# Patient Record
Sex: MALE | Race: BLACK OR AFRICAN AMERICAN | NOT HISPANIC OR LATINO | Employment: FULL TIME | URBAN - METROPOLITAN AREA
[De-identification: names, ages, dates, MRNs, and addresses within clinical notes are randomized per-mention and may not be internally consistent; named-entity substitution may affect disease eponyms.]

---

## 2023-03-18 ENCOUNTER — HOSPITAL ENCOUNTER (EMERGENCY)
Facility: OTHER | Age: 23
Discharge: LEFT BEFORE DISCHARGE | End: 2023-03-18
Payer: MEDICAID

## 2023-03-18 VITALS
HEIGHT: 69 IN | OXYGEN SATURATION: 100 % | BODY MASS INDEX: 23.7 KG/M2 | RESPIRATION RATE: 18 BRPM | HEART RATE: 59 BPM | SYSTOLIC BLOOD PRESSURE: 142 MMHG | WEIGHT: 160 LBS | DIASTOLIC BLOOD PRESSURE: 69 MMHG | TEMPERATURE: 98 F

## 2023-03-18 PROCEDURE — 99283 EMERGENCY DEPT VISIT LOW MDM: CPT

## 2023-03-18 NOTE — FIRST PROVIDER EVALUATION
Emergency Department TeleTriage Encounter Note      CHIEF COMPLAINT    Chief Complaint   Patient presents with    thumb/hand pain     Co L sided hand/thumb pain onset yesterday, denies injury/trauma. States pain starts at the wrist and radiates up to thumb, reports numbness/tingling to thumb. 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        VITAL SIGNS   Initial Vitals [03/18/23 1629]   BP Pulse Resp Temp SpO2   (!) 142/69 (!) 59 18 97.8 °F (36.6 °C) 100 %      MAP       --            ALLERGIES    Review of patient's allergies indicates:  No Known Allergies    PROVIDER TRIAGE NOTE  This is a teletriage evaluation of a 22 y.o. male presenting to the ED complaining of left hand pain starting yesterday. Pain at thenar aspect of left hand. No trauma. Reports he works with his hands at work.     Well-appearing, no distress.     Initial orders will be placed and care will be transferred to an alternate provider when patient is roomed for a full evaluation. Any additional orders and the final disposition will be determined by that provider.         ORDERS  Labs Reviewed - No data to display    ED Orders (720h ago, onward)      Start Ordered     Status Ordering Provider    03/18/23 1638 03/18/23 1638  X-Ray Hand 3 View Left  1 time imaging         Ordered BRISA SPIVEY              Virtual Visit Note: The provider triage portion of this emergency department evaluation and documentation was performed via [employer], a HIPAA-compliant telemedicine application, in concert with a tele-presenter in the room. A face to face patient evaluation with one of my colleagues will occur once the patient is placed in an emergency department room.      DISCLAIMER: This note was prepared with [employer] voice recognition transcription software. Garbled syntax, mangled pronouns, and other bizarre constructions may be attributed to that software system.

## 2024-01-14 ENCOUNTER — HOSPITAL ENCOUNTER (EMERGENCY)
Facility: OTHER | Age: 24
Discharge: HOME | End: 2024-01-15
Attending: EMERGENCY MEDICINE
Payer: MEDICAID

## 2024-01-14 DIAGNOSIS — S92.411A CLOSED DISPLACED FRACTURE OF PROXIMAL PHALANX OF RIGHT GREAT TOE, INITIAL ENCOUNTER: Primary | ICD-10-CM

## 2024-01-14 PROCEDURE — 99283 EMERGENCY DEPT VISIT LOW MDM: CPT

## 2024-01-15 VITALS
OXYGEN SATURATION: 98 % | SYSTOLIC BLOOD PRESSURE: 130 MMHG | DIASTOLIC BLOOD PRESSURE: 70 MMHG | HEART RATE: 71 BPM | TEMPERATURE: 100 F | RESPIRATION RATE: 18 BRPM

## 2024-01-15 PROCEDURE — 25000003 PHARM REV CODE 250: Performed by: EMERGENCY MEDICINE

## 2024-01-15 RX ORDER — IBUPROFEN 400 MG/1
800 TABLET ORAL
Status: COMPLETED | OUTPATIENT
Start: 2024-01-15 | End: 2024-01-15

## 2024-01-15 RX ORDER — IBUPROFEN 800 MG/1
800 TABLET ORAL EVERY 6 HOURS PRN
Qty: 20 TABLET | Refills: 0 | Status: SHIPPED | OUTPATIENT
Start: 2024-01-15

## 2024-01-15 RX ADMIN — IBUPROFEN 800 MG: 400 TABLET ORAL at 12:01
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
## 2024-01-22 ENCOUNTER — HOSPITAL ENCOUNTER (EMERGENCY)
Facility: OTHER | Age: 24
Discharge: LEFT BEFORE DISCHARGE | End: 2024-01-22
Payer: MEDICAID

## 2024-01-22 VITALS
WEIGHT: 160 LBS | TEMPERATURE: 99 F | OXYGEN SATURATION: 99 % | RESPIRATION RATE: 17 BRPM | SYSTOLIC BLOOD PRESSURE: 128 MMHG | DIASTOLIC BLOOD PRESSURE: 80 MMHG | HEART RATE: 89 BPM | BODY MASS INDEX: 23.7 KG/M2 | HEIGHT: 69 IN

## 2024-01-22 LAB
CTP QC/QA: YES
POC MOLECULAR INFLUENZA A AGN: NEGATIVE
POC MOLECULAR INFLUENZA B AGN: POSITIVE

## 2024-01-22 PROCEDURE — 99282 EMERGENCY DEPT VISIT SF MDM: CPT

## 2024-01-22 RX ORDER — ONDANSETRON 4 MG/1
4 TABLET, ORALLY DISINTEGRATING ORAL
Status: DISCONTINUED | OUTPATIENT
Start: 2024-01-22 | End: 2024-01-22 | Stop reason: HOSPADM

## 2024-01-23 NOTE — FIRST PROVIDER EVALUATION
Emergency Department TeleTriage Encounter Note      CHIEF COMPLAINT    Chief Complaint   Patient presents with    Cough     Pt c/o cough, sore throat, and runny nose x3 days. Reports roommate had Flu. Attempted Tylenol and DayQuil with no relief.        VITAL SIGNS   Initial Vitals [01/22/24 1940]   BP Pulse Resp Temp SpO2   128/80 89 17 98.5 °F (36.9 °C) 99 %      MAP       --            ALLERGIES    Review of patient's allergies indicates:  No Known Allergies    PROVIDER TRIAGE NOTE  Patient presents with cough, sore throat, rhinorrhea and nausea.       ORDERS  Labs Reviewed - No data to display    ED Orders (720h ago, onward)      None              Virtual Visit Note: The provider triage portion of this emergency department evaluation and documentation was performed via [employer], a HIPAA-compliant telemedicine application, in concert with a tele-presenter in the room. A face to face patient evaluation with one of my colleagues will occur once the patient is placed in an emergency department room.      DISCLAIMER: This note was prepared with [employer] voice recognition transcription software. Garbled syntax, mangled pronouns, and other bizarre constructions may be attributed to that software system.

## 2024-01-23 NOTE — ED NOTES
"In attempt to room pt, unable to locate.  reports that pt eloped and stated "I don't want to wait".  "

## 2024-01-23 NOTE — ED NOTES
""If they are just going to give me Tylenol I might as well go home, I got my own Tylenol there".  "

## 2024-01-23 NOTE — ED TRIAGE NOTES
Productive cough with fever and sore throat x 3 days. Taking OTC cold meds with minimal relief. Reports yellow sputum. Presents awake, alert. No distress.
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
## 2025-03-26 ENCOUNTER — HOSPITAL ENCOUNTER (EMERGENCY)
Facility: OTHER | Age: 25
Discharge: HOME | End: 2025-03-26
Attending: EMERGENCY MEDICINE
Payer: MEDICAID

## 2025-03-26 VITALS
HEART RATE: 100 BPM | BODY MASS INDEX: 25.73 KG/M2 | DIASTOLIC BLOOD PRESSURE: 61 MMHG | OXYGEN SATURATION: 95 % | HEIGHT: 72 IN | TEMPERATURE: 98 F | WEIGHT: 190 LBS | SYSTOLIC BLOOD PRESSURE: 138 MMHG | RESPIRATION RATE: 15 BRPM

## 2025-03-26 DIAGNOSIS — R60.0 PEDAL EDEMA: Primary | ICD-10-CM

## 2025-03-26 PROCEDURE — 99281 EMR DPT VST MAYX REQ PHY/QHP: CPT

## 2025-03-26 NOTE — ED TRIAGE NOTES
Pt presents to ED with complaint of bilateral foot pain. States that pain started after pt worse shoes that were too big and irritated feet. Aaox4. NAD. VSS

## 2025-03-26 NOTE — Clinical Note
"Migue"Natalia Presley was seen and treated in our emergency department on 3/26/2025.  He may return to work on 03/27/2025.       If you have any questions or concerns, please don't hesitate to call.      Montse Bustillo MD"

## 2025-03-26 NOTE — ED PROVIDER NOTES
Encounter Date: 3/26/2025       History     Chief Complaint   Patient presents with    Foot Pain     Pain to the tops of both feet from wearing shoes that were too big, pt doesn't think he can work tomorrow and is requesting a work note     24-year-old male with no known past medical history presents complaining of bilateral foot swelling x2 days.  He reports wearing large boots for work over the past couple of days and thinks that this may have been the cause of the swelling.  He does report being on his feet a lot throughout the day and does not wear any compression or tightly fitting socks.  He denies any chest pain, shortness of breath, swelling in any other areas, fever, or chills.  Denies any trauma to the feet.  He states the swelling does improve when he elevates his feet at night.  He states he is here because he needs a note for work because he does not feel like he can work tomorrow.      Review of patient's allergies indicates:  No Known Allergies  History reviewed. No pertinent past medical history.  History reviewed. No pertinent surgical history.  No family history on file.  Social History[1]  Review of Systems    Physical Exam     Initial Vitals [03/26/25 0025]   BP Pulse Resp Temp SpO2   138/61 100 15 98.2 °F (36.8 °C) 95 %      MAP       --         Physical Exam    Constitutional: He appears well-developed and well-nourished. He is not diaphoretic. No distress.   HENT:   Head: Normocephalic and atraumatic.   Right Ear: External ear normal.   Left Ear: External ear normal.   Nose: Nose normal.   Cardiovascular:  Normal rate, regular rhythm and normal heart sounds.     Exam reveals no gallop and no friction rub.       No murmur heard.  Pulmonary/Chest: Breath sounds normal. No respiratory distress. He has no wheezes. He has no rhonchi. He has no rales.   Musculoskeletal:         General: Normal range of motion.      Comments: Trace pedal edema with no overlying erythema, warmth, or tenderness to  palpation.  No edema to ankles or lower legs.  2+ DP and PT pulses.           ED Course   Procedures  Labs Reviewed - No data to display       Imaging Results    None          Medications - No data to display  Medical Decision Making  24-year-old male presents afebrile, hemodynamically stable and well-appearing with complaint of pedal edema.  On exam he has trace edema to the dorsum of the feet which is symmetric.  There are no overlying skin changes concerning for cellulitis.  I have low suspicion for a DVT given the description of his symptoms.  I do not feel any further emergent evaluation is warranted at this time.  He has been instructed to elevate his feet whenever not walking and to wear compression stockings while at work.  PCP follow-up for re-evaluation as needed.                                      Clinical Impression:  Final diagnoses:  [R60.0] Pedal edema (Primary)          ED Disposition Condition    Discharge Stable          ED Prescriptions    None       Follow-up Information       Follow up With Specialties Details Why Contact Info    [facility] Schedule an appointment as soon as possible for a visit   [address]  [address] [id number] [phone]               [1]   Social History  Tobacco Use    Smoking status: Some Days     Current packs/day: 0.50     Average packs/day: 0.5 packs/day for 7.2 years (3.6 ttl pk-yrs)     Types: Cigarettes     Start date: 2018    Smokeless tobacco: Never   Substance Use Topics    Alcohol use: Not Currently    Drug use: Never        Montse Bustillo MD  03/26/25 0055

## 2025-04-02 ENCOUNTER — HOSPITAL ENCOUNTER (EMERGENCY)
Facility: OTHER | Age: 25
Discharge: HOME | End: 2025-04-02
Attending: EMERGENCY MEDICINE
Payer: MEDICAID

## 2025-04-02 VITALS
WEIGHT: 185 LBS | HEIGHT: 72 IN | SYSTOLIC BLOOD PRESSURE: 134 MMHG | RESPIRATION RATE: 18 BRPM | DIASTOLIC BLOOD PRESSURE: 69 MMHG | BODY MASS INDEX: 25.06 KG/M2 | OXYGEN SATURATION: 99 % | HEART RATE: 82 BPM | TEMPERATURE: 98 F

## 2025-04-02 DIAGNOSIS — R60.0 PEDAL EDEMA: Primary | ICD-10-CM

## 2025-04-02 LAB
ABSOLUTE EOSINOPHIL (OHS): 0.44 K/UL
ABSOLUTE MONOCYTE (OHS): 1.25 K/UL (ref 0.3–1)
ABSOLUTE NEUTROPHIL COUNT (OHS): 7.24 K/UL (ref 1.8–7.7)
ALBUMIN SERPL BCP-MCNC: 3.9 G/DL (ref 3.5–5.2)
ALP SERPL-CCNC: 59 UNIT/L (ref 40–150)
ALT SERPL W/O P-5'-P-CCNC: 22 UNIT/L (ref 10–44)
ANION GAP (OHS): 11 MMOL/L (ref 8–16)
AST SERPL-CCNC: 28 UNIT/L (ref 11–45)
BASOPHILS # BLD AUTO: 0.05 K/UL
BASOPHILS NFR BLD AUTO: 0.4 %
BILIRUB SERPL-MCNC: 0.5 MG/DL (ref 0.1–1)
BNP SERPL-MCNC: <10 PG/ML (ref 0–99)
BUN SERPL-MCNC: 15 MG/DL (ref 6–20)
CALCIUM SERPL-MCNC: 9.3 MG/DL (ref 8.7–10.5)
CHLORIDE SERPL-SCNC: 104 MMOL/L (ref 95–110)
CO2 SERPL-SCNC: 24 MMOL/L (ref 23–29)
CREAT SERPL-MCNC: 0.9 MG/DL (ref 0.5–1.4)
ERYTHROCYTE [DISTWIDTH] IN BLOOD BY AUTOMATED COUNT: 12.8 % (ref 11.5–14.5)
GFR SERPLBLD CREATININE-BSD FMLA CKD-EPI: >60 ML/MIN/1.73/M2
GLUCOSE SERPL-MCNC: 119 MG/DL (ref 70–110)
HCT VFR BLD AUTO: 38.3 % (ref 40–54)
HGB BLD-MCNC: 13.6 GM/DL (ref 14–18)
HOLD SPECIMEN: NORMAL
IMM GRANULOCYTES # BLD AUTO: 0.03 K/UL (ref 0–0.04)
IMM GRANULOCYTES NFR BLD AUTO: 0.2 % (ref 0–0.5)
LYMPHOCYTES # BLD AUTO: 3.58 K/UL (ref 1–4.8)
MCH RBC QN AUTO: 32 PG (ref 27–31)
MCHC RBC AUTO-ENTMCNC: 35.5 G/DL (ref 32–36)
MCV RBC AUTO: 90 FL (ref 82–98)
NUCLEATED RBC (/100WBC) (OHS): 0 /100 WBC
PLATELET # BLD AUTO: 314 K/UL (ref 150–450)
PMV BLD AUTO: 9.6 FL (ref 9.2–12.9)
POTASSIUM SERPL-SCNC: 3.7 MMOL/L (ref 3.5–5.1)
PROT SERPL-MCNC: 7.5 GM/DL (ref 6–8.4)
RBC # BLD AUTO: 4.25 M/UL (ref 4.6–6.2)
RELATIVE EOSINOPHIL (OHS): 3.5 %
RELATIVE LYMPHOCYTE (OHS): 28.4 % (ref 18–48)
RELATIVE MONOCYTE (OHS): 9.9 % (ref 4–15)
RELATIVE NEUTROPHIL (OHS): 57.6 % (ref 38–73)
SODIUM SERPL-SCNC: 139 MMOL/L (ref 136–145)
WBC # BLD AUTO: 12.59 K/UL (ref 3.9–12.7)

## 2025-04-02 PROCEDURE — 36415 COLL VENOUS BLD VENIPUNCTURE: CPT | Performed by: EMERGENCY MEDICINE

## 2025-04-02 PROCEDURE — 85025 COMPLETE CBC W/AUTO DIFF WBC: CPT | Performed by: EMERGENCY MEDICINE

## 2025-04-02 PROCEDURE — 80053 COMPREHEN METABOLIC PANEL: CPT | Performed by: EMERGENCY MEDICINE

## 2025-04-02 PROCEDURE — 83880 ASSAY OF NATRIURETIC PEPTIDE: CPT | Performed by: EMERGENCY MEDICINE

## 2025-04-02 PROCEDURE — 99283 EMERGENCY DEPT VISIT LOW MDM: CPT

## 2025-04-02 NOTE — ED TRIAGE NOTES
Migue Presley, a 24 y.o. male presents to the ED w/ complaint of bilateral feet swelling that started two weeks ago. Pt denies any new activities and states he has been working with the same company for over three years on and off. Pt also denies any trauma to his feet. Pt also endorses 2 weeks ago after noticing the swelling he started wearing compress shock. Pt is AAOX4 with no known HX and no acute distress.      Triage note:  Chief Complaint   Patient presents with    Leg Swelling     Pt states that he started to have non traumatic bilateral feet swelling that started 2 weeks ago. No hx     Review of patient's allergies indicates:  No Known Allergies  History reviewed. No pertinent past medical history.

## 2025-04-02 NOTE — ED PROVIDER NOTES
Encounter Date: 4/2/2025       History     Chief Complaint   Patient presents with    Leg Swelling     Pt states that he started to have non traumatic bilateral feet swelling that started 2 weeks ago. No hx     24-year-old male with no significant past medical history presents with complaint of recurrent foot swelling.  The patient was seen in this emergency department last month for the same complaint.  He denies any new or worsening symptoms.  Specifically denies any shortness of breath, chest pain, facial swelling, or upper extremity swelling.  He continues to report improvement with elevation and when he wears tight fitting socks.      Review of patient's allergies indicates:  No Known Allergies  History reviewed. No pertinent past medical history.  History reviewed. No pertinent surgical history.  No family history on file.  Social History[1]  Review of Systems    Physical Exam     Initial Vitals [04/02/25 0015]   BP Pulse Resp Temp SpO2   (!) 140/78 100 18 98.1 °F (36.7 °C) 99 %      MAP       --         Physical Exam    Vitals reviewed.  Constitutional: He appears well-developed and well-nourished. He is not diaphoretic. No distress.   HENT:   Head: Normocephalic and atraumatic.   Right Ear: External ear normal.   Left Ear: External ear normal.   Nose: Nose normal.   Eyes: Conjunctivae and EOM are normal.   Neck: No JVD present.   Cardiovascular:  Normal rate, regular rhythm, normal heart sounds and intact distal pulses.     Exam reveals no gallop and no friction rub.       No murmur heard.  Pulmonary/Chest: Breath sounds normal. No respiratory distress. He has no wheezes. He has no rhonchi. He has no rales.   Musculoskeletal:      Comments: 1+ pedal edema bilaterally.  No leg or upper extremity edema.     Neurological: He is alert and oriented to person, place, and time.   Skin:   No facial edema.         ED Course   Procedures  Labs Reviewed   COMPREHENSIVE METABOLIC PANEL - Abnormal       Result Value     Sodium 139      Potassium 3.7      Chloride 104      CO2 24      Glucose 119 (*)     BUN 15      Creatinine 0.9      Calcium 9.3      Protein Total 7.5      Albumin 3.9      Bilirubin Total 0.5      ALP 59      AST 28      ALT 22      Anion Gap 11      eGFR >60     CBC WITH DIFFERENTIAL - Abnormal    WBC 12.59      RBC 4.25 (*)     HGB 13.6 (*)     HCT 38.3 (*)     MCV 90      MCH 32.0 (*)     MCHC 35.5      RDW 12.8      Platelet Count 314      MPV 9.6      Nucleated RBC 0      Neut % 57.6      Lymph % 28.4      Mono % 9.9      Eos % 3.5      Basophil % 0.4      Imm Grans % 0.2      Neut # 7.24      Lymph # 3.58      Mono # 1.25 (*)     Eos # 0.44      Baso # 0.05      Imm Grans # 0.03     B-TYPE NATRIURETIC PEPTIDE - Normal    BNP <10     CBC W/ AUTO DIFFERENTIAL    Narrative:     The following orders were created for panel order CBC Auto Differential.  Procedure                               Abnormality         Status                     ---------                               -----------         ------                     CBC with Differential[[id number]]        Abnormal            Final result                 Please view results for these tests on the individual orders.   EXTRA TUBES    Narrative:     The following orders were created for panel order EXTRA TUBES.  Procedure                               Abnormality         Status                     ---------                               -----------         ------                     Light Green Top Hold[[id number]]                             Final result               Gold Top Hold[[id number]]                                    Final result               Gold Top Hold[[id number]]                                    Final result                 Please view results for these tests on the individual orders.   LIGHT GREEN TOP HOLD    Extra Tube extra     GOLD TOP HOLD    Extra Tube extra     GOLD TOP HOLD    Extra Tube extra            Imaging Results    None           Medications - No data to display  Medical Decision Making  24-year-old male with no significant past medical history presents with complaint of pedal edema.  He was seen in this emergency department by me 7 days ago for the same complaint and denies any new or worsening symptoms since then.  Will obtain labs to evaluate for possible kidney failure, liver failure or heart failure given persistence of edema although I suspect that this is dependent edema secondary to standing for prolonged periods of time at work.    CBC, CMP, and BNP without any significant abnormalities.  Patient instructed to continue using compression stockings while at work and to elevate his feet whenever he is not walking.  PCP follow-up for re-evaluation and further management.    Amount and/or Complexity of Data Reviewed  Labs: ordered.                                      Clinical Impression:  Final diagnoses:  [R60.0] Pedal edema (Primary)          ED Disposition Condition    Discharge Stable          ED Prescriptions    None       Follow-up Information       Follow up With Specialties Details Why Contact [facility] Schedule an appointment as soon as possible for a visit   [address]  [address] [id number] [phone]                 [1]   Social History  Tobacco Use    Smoking status: Some Days     Current packs/day: 0.50     Average packs/day: 0.5 packs/day for 7.2 years (3.6 ttl pk-yrs)     Types: Cigarettes     Start date: 2018    Smokeless tobacco: Never   Substance Use Topics    Alcohol use: Not Currently    Drug use: Never        Montse Bustillo MD  04/02/25 0130

## 2025-05-08 ENCOUNTER — HOSPITAL ENCOUNTER (EMERGENCY)
Facility: OTHER | Age: 25
Discharge: LEFT BEFORE DISCHARGE | End: 2025-05-08
Payer: MEDICAID

## 2025-05-08 PROCEDURE — 99999 HC NO LEVEL OF SERVICE - ED ONLY: CPT

## 2025-05-08 NOTE — ED NOTES
Walked in to ED/triage and states can not be here long, requesting to leave to go take Benadryl as previously directed, ledt without incident